# Patient Record
Sex: MALE | Race: WHITE | Employment: FULL TIME | URBAN - NONMETROPOLITAN AREA
[De-identification: names, ages, dates, MRNs, and addresses within clinical notes are randomized per-mention and may not be internally consistent; named-entity substitution may affect disease eponyms.]

---

## 2018-10-16 ENCOUNTER — APPOINTMENT (OUTPATIENT)
Dept: ANESTHESIOLOGY | Facility: HOSPITAL | Age: 63
End: 2018-10-16
Attending: OPHTHALMOLOGY
Payer: MEDICARE

## 2018-10-16 PROCEDURE — 00142 ANES PX ON EYE LENS SURGERY: CPT | Mod: QZ | Performed by: NURSE ANESTHETIST, CERTIFIED REGISTERED

## 2018-10-30 ENCOUNTER — APPOINTMENT (OUTPATIENT)
Dept: ANESTHESIOLOGY | Facility: HOSPITAL | Age: 63
End: 2018-10-30
Attending: OPHTHALMOLOGY
Payer: MEDICARE

## 2018-10-30 PROCEDURE — 00142 ANES PX ON EYE LENS SURGERY: CPT | Mod: QZ | Performed by: NURSE ANESTHETIST, CERTIFIED REGISTERED

## 2020-08-05 ENCOUNTER — TRANSFERRED RECORDS (OUTPATIENT)
Dept: HEALTH INFORMATION MANAGEMENT | Facility: CLINIC | Age: 65
End: 2020-08-05

## 2020-08-05 ENCOUNTER — HOSPITAL ENCOUNTER (EMERGENCY)
Facility: HOSPITAL | Age: 65
Discharge: ANOTHER HOSPITAL | End: 2020-08-05
Attending: INTERNAL MEDICINE | Admitting: INTERNAL MEDICINE
Payer: MEDICARE

## 2020-08-05 ENCOUNTER — APPOINTMENT (OUTPATIENT)
Dept: GENERAL RADIOLOGY | Facility: HOSPITAL | Age: 65
End: 2020-08-05
Attending: INTERNAL MEDICINE
Payer: MEDICARE

## 2020-08-05 VITALS
HEIGHT: 68 IN | RESPIRATION RATE: 16 BRPM | DIASTOLIC BLOOD PRESSURE: 65 MMHG | OXYGEN SATURATION: 98 % | SYSTOLIC BLOOD PRESSURE: 124 MMHG | WEIGHT: 165 LBS | TEMPERATURE: 95.6 F | HEART RATE: 80 BPM | BODY MASS INDEX: 25.01 KG/M2

## 2020-08-05 DIAGNOSIS — I21.09 ST ELEVATION MYOCARDIAL INFARCTION (STEMI) INVOLVING OTHER CORONARY ARTERY OF ANTERIOR WALL (H): ICD-10-CM

## 2020-08-05 LAB
ALBUMIN SERPL-MCNC: 3.3 G/DL (ref 3.4–5)
ALP SERPL-CCNC: 100 U/L (ref 40–150)
ALT SERPL W P-5'-P-CCNC: 23 U/L (ref 0–70)
ANION GAP SERPL CALCULATED.3IONS-SCNC: 10 MMOL/L (ref 3–14)
AST SERPL W P-5'-P-CCNC: 16 U/L (ref 0–45)
BASOPHILS # BLD AUTO: 0.1 10E9/L (ref 0–0.2)
BASOPHILS NFR BLD AUTO: 0.6 %
BILIRUB SERPL-MCNC: 0.2 MG/DL (ref 0.2–1.3)
BUN SERPL-MCNC: 23 MG/DL (ref 7–30)
CALCIUM SERPL-MCNC: 8.1 MG/DL (ref 8.5–10.1)
CHLORIDE SERPL-SCNC: 107 MMOL/L (ref 94–109)
CO2 SERPL-SCNC: 22 MMOL/L (ref 20–32)
CREAT SERPL-MCNC: 1.07 MG/DL (ref 0.66–1.25)
DIFFERENTIAL METHOD BLD: ABNORMAL
EJECTION FRACTION: 41.9 %
EOSINOPHIL # BLD AUTO: 0.5 10E9/L (ref 0–0.7)
EOSINOPHIL NFR BLD AUTO: 3.9 %
ERYTHROCYTE [DISTWIDTH] IN BLOOD BY AUTOMATED COUNT: 12.7 % (ref 10–15)
GFR SERPL CREATININE-BSD FRML MDRD: 71 ML/MIN/{1.73_M2}
GLUCOSE BLDC GLUCOMTR-MCNC: 121 MG/DL (ref 70–99)
GLUCOSE SERPL-MCNC: 176 MG/DL (ref 70–99)
HCT VFR BLD AUTO: 35.1 % (ref 40–53)
HGB BLD-MCNC: 12.7 G/DL (ref 13.3–17.7)
IMM GRANULOCYTES # BLD: 0.1 10E9/L (ref 0–0.4)
IMM GRANULOCYTES NFR BLD: 0.4 %
LYMPHOCYTES # BLD AUTO: 4.6 10E9/L (ref 0.8–5.3)
LYMPHOCYTES NFR BLD AUTO: 35.9 %
MCH RBC QN AUTO: 31.5 PG (ref 26.5–33)
MCHC RBC AUTO-ENTMCNC: 36.2 G/DL (ref 31.5–36.5)
MCV RBC AUTO: 87 FL (ref 78–100)
MONOCYTES # BLD AUTO: 1.3 10E9/L (ref 0–1.3)
MONOCYTES NFR BLD AUTO: 10.4 %
NEUTROPHILS # BLD AUTO: 6.2 10E9/L (ref 1.6–8.3)
NEUTROPHILS NFR BLD AUTO: 48.8 %
NRBC # BLD AUTO: 0 10*3/UL
NRBC BLD AUTO-RTO: 0 /100
PLATELET # BLD AUTO: 300 10E9/L (ref 150–450)
POTASSIUM SERPL-SCNC: 2.8 MMOL/L (ref 3.4–5.3)
PROT SERPL-MCNC: 6.9 G/DL (ref 6.8–8.8)
RBC # BLD AUTO: 4.03 10E12/L (ref 4.4–5.9)
SODIUM SERPL-SCNC: 139 MMOL/L (ref 133–144)
TROPONIN I SERPL-MCNC: <0.015 UG/L (ref 0–0.04)
WBC # BLD AUTO: 12.8 10E9/L (ref 4–11)

## 2020-08-05 PROCEDURE — 25000132 ZZH RX MED GY IP 250 OP 250 PS 637: Mod: GY | Performed by: INTERNAL MEDICINE

## 2020-08-05 PROCEDURE — 93010 ELECTROCARDIOGRAM REPORT: CPT | Performed by: INTERNAL MEDICINE

## 2020-08-05 PROCEDURE — 36415 COLL VENOUS BLD VENIPUNCTURE: CPT | Performed by: INTERNAL MEDICINE

## 2020-08-05 PROCEDURE — 96374 THER/PROPH/DIAG INJ IV PUSH: CPT

## 2020-08-05 PROCEDURE — 96375 TX/PRO/DX INJ NEW DRUG ADDON: CPT

## 2020-08-05 PROCEDURE — 80053 COMPREHEN METABOLIC PANEL: CPT | Performed by: INTERNAL MEDICINE

## 2020-08-05 PROCEDURE — 93005 ELECTROCARDIOGRAM TRACING: CPT

## 2020-08-05 PROCEDURE — 84484 ASSAY OF TROPONIN QUANT: CPT | Performed by: INTERNAL MEDICINE

## 2020-08-05 PROCEDURE — 00000146 ZZHCL STATISTIC GLUCOSE BY METER IP

## 2020-08-05 PROCEDURE — 85025 COMPLETE CBC W/AUTO DIFF WBC: CPT | Performed by: INTERNAL MEDICINE

## 2020-08-05 PROCEDURE — 99285 EMERGENCY DEPT VISIT HI MDM: CPT | Mod: Z6 | Performed by: INTERNAL MEDICINE

## 2020-08-05 PROCEDURE — 25000128 H RX IP 250 OP 636: Performed by: INTERNAL MEDICINE

## 2020-08-05 PROCEDURE — 99291 CRITICAL CARE FIRST HOUR: CPT | Mod: 25

## 2020-08-05 PROCEDURE — 25000128 H RX IP 250 OP 636

## 2020-08-05 PROCEDURE — 71045 X-RAY EXAM CHEST 1 VIEW: CPT | Mod: TC

## 2020-08-05 RX ORDER — TRAMADOL HYDROCHLORIDE 50 MG/1
50 TABLET ORAL EVERY 6 HOURS PRN
COMMUNITY

## 2020-08-05 RX ORDER — ALBUTEROL SULFATE 1.25 MG/3ML
1.25 SOLUTION RESPIRATORY (INHALATION) EVERY 6 HOURS PRN
COMMUNITY

## 2020-08-05 RX ORDER — FLUOXETINE 10 MG/1
40 CAPSULE ORAL DAILY
COMMUNITY

## 2020-08-05 RX ORDER — METOPROLOL SUCCINATE 25 MG/1
25 TABLET, EXTENDED RELEASE ORAL DAILY
COMMUNITY

## 2020-08-05 RX ORDER — ASPIRIN 81 MG/1
162 TABLET, CHEWABLE ORAL ONCE
Status: COMPLETED | OUTPATIENT
Start: 2020-08-05 | End: 2020-08-05

## 2020-08-05 RX ORDER — MORPHINE SULFATE 2 MG/ML
2 INJECTION, SOLUTION INTRAMUSCULAR; INTRAVENOUS ONCE
Status: COMPLETED | OUTPATIENT
Start: 2020-08-05 | End: 2020-08-05

## 2020-08-05 RX ORDER — HEPARIN SODIUM 5000 [USP'U]/.5ML
60 INJECTION, SOLUTION INTRAVENOUS; SUBCUTANEOUS ONCE
Status: COMPLETED | OUTPATIENT
Start: 2020-08-05 | End: 2020-08-05

## 2020-08-05 RX ORDER — GABAPENTIN 300 MG/1
300 CAPSULE ORAL AT BEDTIME
COMMUNITY

## 2020-08-05 RX ORDER — NITROGLYCERIN 20 MG/100ML
INJECTION INTRAVENOUS
Status: COMPLETED
Start: 2020-08-05 | End: 2020-08-05

## 2020-08-05 RX ORDER — ASPIRIN 81 MG/1
81 TABLET ORAL DAILY
COMMUNITY

## 2020-08-05 RX ORDER — MORPHINE SULFATE 2 MG/ML
4 INJECTION, SOLUTION INTRAMUSCULAR; INTRAVENOUS ONCE
Status: COMPLETED | OUTPATIENT
Start: 2020-08-05 | End: 2020-08-05

## 2020-08-05 RX ADMIN — MORPHINE SULFATE 4 MG: 2 INJECTION, SOLUTION INTRAMUSCULAR; INTRAVENOUS at 05:36

## 2020-08-05 RX ADMIN — NITROGLYCERIN 50000 MCG: 20 INJECTION INTRAVENOUS at 05:45

## 2020-08-05 RX ADMIN — HEPARIN SODIUM 4500 UNITS: 5000 INJECTION, SOLUTION INTRAVENOUS; SUBCUTANEOUS at 05:31

## 2020-08-05 RX ADMIN — MORPHINE SULFATE 2 MG: 2 INJECTION, SOLUTION INTRAMUSCULAR; INTRAVENOUS at 05:28

## 2020-08-05 RX ADMIN — ASPIRIN 81 MG 162 MG: 81 TABLET ORAL at 05:29

## 2020-08-05 RX ADMIN — TICAGRELOR 180 MG: 90 TABLET ORAL at 05:29

## 2020-08-05 ASSESSMENT — ENCOUNTER SYMPTOMS
FEVER: 0
BLOOD IN STOOL: 0
NUMBNESS: 0
DIAPHORESIS: 1
COLOR CHANGE: 0
PALPITATIONS: 0
ABDOMINAL DISTENTION: 0
WHEEZING: 0
ABDOMINAL PAIN: 0
FREQUENCY: 0
CHILLS: 0
VOMITING: 0
DIZZINESS: 0
COUGH: 0
ANAL BLEEDING: 0
CONFUSION: 0
SHORTNESS OF BREATH: 0
DYSURIA: 0
CHEST TIGHTNESS: 0
BACK PAIN: 0
FLANK PAIN: 0
NAUSEA: 0
LIGHT-HEADEDNESS: 0
HEADACHES: 0
WEAKNESS: 0
SLEEP DISTURBANCE: 0
NECK PAIN: 0
VOICE CHANGE: 0
MYALGIAS: 0

## 2020-08-05 ASSESSMENT — MIFFLIN-ST. JEOR: SCORE: 1497.94

## 2020-08-05 NOTE — ED PROVIDER NOTES
History     Chief Complaint   Patient presents with     Chest Pain     Lt chest pain     The history is provided by the patient.   Chest Pain   Pain location:  L chest  Pain quality: aching and pressure    Pain radiates to:  L shoulder  Onset quality:  Sudden  Duration:  1 hour  Timing:  Constant  Progression:  Worsening  Chronicity:  New  Associated symptoms: diaphoresis    Associated symptoms: no abdominal pain, no back pain, no cough, no dizziness, no fever, no headache, no nausea, no numbness, no palpitations, no shortness of breath, no vomiting and no weakness          Allergies:  No Known Allergies    Problem List:    There are no active problems to display for this patient.       Past Medical History:    No past medical history on file.    Past Surgical History:    No past surgical history on file.    Family History:    No family history on file.    Social History:  Marital Status:   [2]  Social History     Tobacco Use     Smoking status: Not on file   Substance Use Topics     Alcohol use: Not on file     Drug use: Not on file        Medications:    albuterol (ACCUNEB) 1.25 MG/3ML neb solution  aspirin 81 MG EC tablet  FLUoxetine (PROZAC) 10 MG capsule  gabapentin (NEURONTIN) 300 MG capsule  metoprolol succinate ER (TOPROL-XL) 25 MG 24 hr tablet  traMADol (ULTRAM) 50 MG tablet          Review of Systems   Constitutional: Positive for diaphoresis. Negative for chills and fever.   HENT: Negative for voice change.    Eyes: Negative for visual disturbance.   Respiratory: Negative for cough, chest tightness, shortness of breath and wheezing.    Cardiovascular: Positive for chest pain. Negative for palpitations and leg swelling.   Gastrointestinal: Negative for abdominal distention, abdominal pain, anal bleeding, blood in stool, nausea and vomiting.   Genitourinary: Negative for decreased urine volume, dysuria, flank pain and frequency.   Musculoskeletal: Negative for back pain, gait problem, myalgias and  "neck pain.   Skin: Negative for color change, pallor and rash.   Neurological: Negative for dizziness, syncope, weakness, light-headedness, numbness and headaches.   Psychiatric/Behavioral: Negative for confusion, sleep disturbance and suicidal ideas.       Physical Exam   BP: 90/55  Pulse: 63  Temp: (!) 95.4  F (35.2  C)  Resp: 15  Height: 172.7 cm (5' 8\")  Weight: 74.8 kg (165 lb)  SpO2: 97 %      Physical Exam  Vitals signs and nursing note reviewed.   Constitutional:       Appearance: He is well-developed.   HENT:      Head: Normocephalic and atraumatic.   Eyes:      Conjunctiva/sclera: Conjunctivae normal.      Pupils: Pupils are equal, round, and reactive to light.   Neck:      Musculoskeletal: Normal range of motion and neck supple.      Thyroid: No thyromegaly.      Vascular: No JVD.      Trachea: No tracheal deviation.   Cardiovascular:      Rate and Rhythm: Normal rate and regular rhythm.      Heart sounds: Normal heart sounds. No murmur. No gallop.    Pulmonary:      Effort: Pulmonary effort is normal. No respiratory distress.      Breath sounds: Normal breath sounds. No stridor. No wheezing or rales.   Chest:      Chest wall: No tenderness.   Abdominal:      General: Bowel sounds are normal. There is no distension.      Palpations: Abdomen is soft. There is no mass.      Tenderness: There is no abdominal tenderness. There is no guarding or rebound.   Musculoskeletal: Normal range of motion.         General: No tenderness.   Lymphadenopathy:      Cervical: No cervical adenopathy.   Skin:     General: Skin is warm.      Coloration: Skin is not pale.      Findings: No erythema or rash.   Neurological:      Mental Status: He is alert and oriented to person, place, and time.   Psychiatric:         Behavior: Behavior normal.         ED Course        Procedures                 Results for orders placed or performed during the hospital encounter of 08/05/20 (from the past 24 hour(s))   CBC with platelets " differential   Result Value Ref Range    WBC 12.8 (H) 4.0 - 11.0 10e9/L    RBC Count 4.03 (L) 4.4 - 5.9 10e12/L    Hemoglobin 12.7 (L) 13.3 - 17.7 g/dL    Hematocrit 35.1 (L) 40.0 - 53.0 %    MCV 87 78 - 100 fl    MCH 31.5 26.5 - 33.0 pg    MCHC 36.2 31.5 - 36.5 g/dL    RDW 12.7 10.0 - 15.0 %    Platelet Count 300 150 - 450 10e9/L    Diff Method Automated Method     % Neutrophils 48.8 %    % Lymphocytes 35.9 %    % Monocytes 10.4 %    % Eosinophils 3.9 %    % Basophils 0.6 %    % Immature Granulocytes 0.4 %    Nucleated RBCs 0 0 /100    Absolute Neutrophil 6.2 1.6 - 8.3 10e9/L    Absolute Lymphocytes 4.6 0.8 - 5.3 10e9/L    Absolute Monocytes 1.3 0.0 - 1.3 10e9/L    Absolute Eosinophils 0.5 0.0 - 0.7 10e9/L    Absolute Basophils 0.1 0.0 - 0.2 10e9/L    Abs Immature Granulocytes 0.1 0 - 0.4 10e9/L    Absolute Nucleated RBC 0.0    Troponin I   Result Value Ref Range    Troponin I ES <0.015 0.000 - 0.045 ug/L       Medications   nitroGLYcerin 50 mg in D5W 250 mL (adult std) 200-5 MCG/ML-% infusion (has no administration in time range)   ticagrelor (BRILINTA) tablet 180 mg (180 mg Oral Given 8/5/20 0529)   aspirin (ASA) chewable tablet 162 mg (162 mg Oral Given 8/5/20 0529)   heparin ANTICOAGULANT injection 4,500 Units (4,500 Units Intravenous Given 8/5/20 0531)   morphine (PF) injection 2 mg (2 mg Intravenous Given 8/5/20 0528)   morphine (PF) injection 4 mg (4 mg Intravenous Given 8/5/20 0536)       Assessments & Plan (with Medical Decision Making)   Left sided Chest pain started at 4:10 am, all the sudden accompanied sweating  Hx of HTN  Received 2 Nitroglycerin in ambulance  EKG: ST elevation in anterolateral lead with reciprocal depression in II, III, AVF  STEMI  Code activated, brilinta 180mg , asa 162( pt already got 162mg at home),  Heparin bolus given in ER  I spoke to Dr rabago in [facility], accepted for transfer to cardiac cath in [facility]  CXR: no acute finding    I have reviewed the nursing notes.    I have  reviewed the findings, diagnosis, plan and need for follow up with the patient.      New Prescriptions    No medications on file       Final diagnoses:   ST elevation myocardial infarction (STEMI) involving other coronary artery of anterior wall (H)       8/5/2020   [facility]     Emmanuel Kerr MD  08/05/20 0586

## 2020-08-05 NOTE — ED NOTES
8/05/2020 at 0750: [facility] RN called to get more information regarding patient transfer and requesting EKG be faxed to [phone]. Update on patient condition also given: Patient went directly to cath lab and received 2 stents, one in the proximal LAD and one in the first diagonal branch, and is currently in the CCU with no complaints. Dr. Kerr informed of this update.

## 2020-08-18 ENCOUNTER — HOSPITAL ENCOUNTER (OUTPATIENT)
Dept: CARDIAC REHAB | Facility: HOSPITAL | Age: 65
Setting detail: THERAPIES SERIES
End: 2020-08-18
Attending: INTERNAL MEDICINE
Payer: MEDICARE

## 2020-08-18 VITALS — HEIGHT: 68 IN | BODY MASS INDEX: 24.98 KG/M2 | WEIGHT: 164.8 LBS

## 2020-08-18 DIAGNOSIS — I21.02 ST ELEVATION MYOCARDIAL INFARCTION INVOLVING LEFT ANTERIOR DESCENDING (LAD) CORONARY ARTERY (H): ICD-10-CM

## 2020-08-18 PROCEDURE — 40000116 ZZH STATISTIC OP CR VISIT

## 2020-08-18 PROCEDURE — 93798 PHYS/QHP OP CAR RHAB W/ECG: CPT

## 2020-08-18 ASSESSMENT — 6 MINUTE WALK TEST (6MWT)
MALE CALC: 1739.42
PREDICTED: 1750.02
FEMALE CALC: 1550.25
TOTAL DISTANCE WALKED (FT): 1594
GENDER SELECTION: MALE

## 2020-08-18 ASSESSMENT — MIFFLIN-ST. JEOR: SCORE: 1497.03

## 2020-08-18 NOTE — PROGRESS NOTES
08/18/20 1200   Session   Session Initial Evaluation and Exercise Prescription   Certified through this date 09/16/20   Cardiac Rehab Assessment   Cardiac Rehab Assessment    General Information   Treatment Diagnosis STEMI8/18: Cornel initiates his Phase II Cardiac Rehab today after having a STEMI and JAMEE X 2 on Aug. 5,  2020. He states he has been doing well since his procedure. He continues to have some shortness of breath with exertion but finds it is less than it had been previous to his MI. 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 He has an albuterol inhaler that he has not been using as it did not help his shortness of breath.   Patient states he has had high blood pressure since he was young. He is treated with a beta blocker and ace inhibitor. Patient has never smoked cigarettes. He does use chewing tobacco. He has reduced his usage of chewing tobacco since he has been released from the hospital. He is not quite ready to choose a quit date.   Patient is quite physically active and states he eats a good diet. Patient is on an antidepressant. It has been determined that he most likely has seasonal depressive disorder. He uses a light therapy in the winter and in other seasons when he is not outside much. Patient will start exercising on 8/20 and will attend Tuesday's and Thursday's at 1PM.  The patient's history and clinical status including hemodynamics and ECG were evaluated. The patient was assessed to be stable and appropriate to begin exercise.   The patient's functional capacity and exercise prescription were determined by the completion of the 6 minute walk test. See results above. The patient was oriented to the program.  Risk factor profile was completed. Goals and objectives were discussed. CV response was WNL. No symptoms, complaints or pain were reported. Good prognosis for reaching  goals below. Skilled therapy is necessary in order to monitor CV response to exercise, to provide education on risk factors and behavior change counseling needed to achieve patient's goals.  Plan to progress to 30-40 minutes of exercise prior to discharge from cardiac rehab.  Initial THR of 20-30 beats above RHR; Effort rating of 4-6. Initiate muscle conditioning as appropriate. Provide risk factor education and behavior change counseling.      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   Lead up symptoms Chest discomfort increased until the point he had his wife call 911.   Hospital Location [facility] Discharge Date 08/07/20   Signs and Symptoms Post Hospital Discharge Other (see comments)  (dyspnea with exertion)   Comments Patient feels like he is almost better than he had been previous to his procedure.    Outpatient Cardiac Rehab Start Date 08/18/20   Primary Physician Damian   Primary Physician Follow Up Scheduled   Surgeon Trang   Surgeon Follow Up Scheduled   Cardiologist Jassi   Cardiologist Follow Up Completed   Ejection Fraction 41.9%   Risk Stratification High   Summary of Cath Report   Summary of Cath Report Available   Date Performed 08/05/20   Cath Report Comments s/p coronary angiogram resulting in successful primary PCI of D1 with POBA and PCI of LAD with JAMEE X 1, followed by successful placement of a second JAMEE X 1 for suspected dissection vs plaque rupture.   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 No   Have you fallen and had an injury in the past year? No   Timed Up and Go score (seconds) NA   Is patient a fall risk? No   Fall screen comments Patient feels steady on his feet.    Abuse Screen (yes response referral indicated)   Feels Unsafe at Home or Work/School no   Feels Threatened by Someone no   Does Anyone Try to Keep You From Having Contact with Others or Doing Things Outside Your Home? no   Physical Signs of Abuse Present no   Pain   Patient Currently in Pain No   Physical Assessments   Incisions WNL   Edema None   Right Lung Sounds normal   Left Lung Sounds normal   Limitations No limitations   Comments Patient's physical assessments are WNL.   Individualized Treatment Plan   Monitored Sessions Scheduled 24   Monitored Sessions Attended 1   Oxygen   Supplemental Oxygen needed No   Nutrition Management - Weight Management   Assessment Initial Assessment   Age 65   Weight 74.8 kg (164 lb 12.8 oz)   Height 1.727 m (5' 8\")   BMI (Calculated) 25.06   Goal Weight 70.3 kg (155 lb)   Initial Rate Your Plate Score. Dietary tool to assess eating patterns. Scores range from 24 to 72. The higher the score the healthier the eating pattern. 55   Weight Management Comments Patient states he has been 160's for his weight for a long time.    Nutrition Management - Lipids   Lipids Labs Available   Date 08/05/20   Total Cholesterol 197   Triglycerides 91   HDL 38      Prescribed Lipid Medication Yes   Statin Intensity High Intensity   Lipid Comments Patient takes medications as prescribed.   Nutrition Management - Diabetes   Diabetes No  (Pre-diabetic)   Nutrition Management Summary   Dietary Recommendations Low Fat;Low Cholesterol;Low Sodium   Stages of Change for Diet Compliance Preparation   Interventions Planned Attend Nutrition Education Class(es)   Nutrition Summary Comments Patient feels he eats a healthy diet. He eats very littel meat.  "   Nutrition Target Outcome Total Chol < 150, HDL > 40 (M), HDL > 50 (W), LDL < 70, Trig < 150   Psychosocial Management   Psychosocial Assessment Initial   Is there history of clinical depression or increased risk of depression? History of clinical depression   Current Level of Stress per Patient Report Denies   Current Coping Skills Uses Stress Management/Relaxation Techniques   Initial Patient Health Questionnaire -9 Score (PHQ-9) for depression. 5-9 Minimal symptoms, 10-14 Minor depression, 15-19 Major depression, moderately severe, > 20 Major depression, severe  2   Initial [facility] Survey score.  Quality of Life:   If total score > 25 review individual areas where patient rated a 4 or 5.  Consider patients current medical condition and what role that plays on the score.   Adjust treatment protocol to improve areas of concern.  Consider the following:  PHQ9 score, DASI, and re-assessment within the next 30 days to assist with developing treatments.  15   Stages of Change Action   Interventions Planned Patient to attend stress management class(es)   Psychosocial Comments Patient takes prozac for depression. He states it is a seasonal disorder. He also uses a light for treatment of his depression when needed.    Psychosocial Target Outcome Maximize coping skills   Other Core Components - Hypertension   History of or Diagnosis of Hypertension Yes   Currently taking Anti-Hypertensives Yes;Beta blocker;Ace Inhibitor   Hypertension Comments Patient takes medications as prescribed.    Other Core Components - Tobacco   History of Tobacco Use Yes   Tobacco Comments Patient has used chewing tobacco for many years. He has never smoked cigarettes. Patient states he is cutting back on the amount of chew he uses.    Other Core Components Summary   Interventions Planned Attend education class on Blood Pressure;Instruct patient on the DASH diet   Other Core Components Comments Patient states he has had high blood pressure  since he was young.    Other Core Components Target Outcome BP < 140/90 or < 130/80 with DM or CKD   Activity/Exercise History   Activity/Exercise Assessment Initial   Activity/Exercise Status prior to event? Was Physically Active   Number of Days Currently participating in Moderate Physical Activity? 7   Number of Days Currently performing  Aerobic Exercise (including rehab)? 0   Number of Minutes per Session Currently of Aerobic Exercise (average)? 0   Current Stage of Change (Physical Activity) Action   Current Stage of Change (Aerobic Exercise) Preparation   Patient Goals Goal #1   Goal #1 Description Patient would like to increase his survey scores and his 6MWT results by attending CR twice per week and implementing home exercise.     Goal #1 Target Date 10/20/20   Activity/Exercise Comments Patient has remained physically active since his MI and procedure.    Activity/Exercise Target Outcome An Accumulation of 150  Minutes of Aerobic Activity per Week   Exercise Assessment   6 Minute Walk Predicted - Gender Selection Male   6 Minute Walk Predicted (Male) 1739.42   6 Minute Walk Predicted (Female) 1550.25   Initial 6 Minute Walk Distance (Feet) 1594 ft   Resting HR 63 bpm   Exercise HR 96 bpm   Post Exercise HR 68 bpm   Resting /66   Exercise /70   Post Exercise /70   Pre SpO2 100   While Exercising SpO2 98   Post SpO2 100   Effort Rating 4   Current MET Level 3.3   MET Level Goal 4-5   ECG Rhythm Normal sinus rhythm   Ectopy None   Current Symptoms Denies symptoms   Limitations/Restrictions None   Exercise Prescription   Mode Nustep;Recumbant bike;Arm Ergometer;Weights  (Patient states he does not like to use a treadmill. )   Duration/Time 30-45 min   Frequency 2 days/week   THR (85% of age predicted max HR) 130.05   OMNI Effort Rating (0-10 Scale) 4-6/10   Progression Total exercise time of 20-30 minutes;Progress peak intensity by 1/4 MET per week   Comments Patient does not like to use  treadmill. He has had an injury to his ankle and treadmills bother this ankle.    Recommended Home Exercise   Type of Exercise Walking   Frequency (days per week) 3-4   Duration (minutes per session) 15-30 min   Effort Rating Recommended 4-6/10   30 Day Exercise Plan Staff discussed a HEP of walking to start with. He does have other exercise equipment at home.    Current Home Exercise   Type of Exercise None   Follow-up/On-going Support   Provider follow-up needed on the following No follow-up needed   Learning Assessment   Learner Patient   Primary Language English   Preferred Learning Style Listening;Reading;Demonstration   Barriers to Learning No barriers noted   Patient Education   Education recommended Anatomy and Physiology of the Heart;Blood Pressure;Exercise Principles;Heart Failure;Medication Overview;Muscle Conditioning;Nutrition;Risk Factors;Stress Management   Education Comments Patient will be given edcuation on pertinent topics during exercise sessions.    Physician cosignature/electronic signature indicates approval of this ITP document. I have established, reviewed and made necessary changes to the individualized treatment plan and exercise prescription for this patient.

## 2020-08-20 ENCOUNTER — HOSPITAL ENCOUNTER (OUTPATIENT)
Dept: CARDIAC REHAB | Facility: HOSPITAL | Age: 65
Setting detail: THERAPIES SERIES
End: 2020-08-20
Attending: INTERNAL MEDICINE
Payer: MEDICARE

## 2020-08-20 PROCEDURE — 40000116 ZZH STATISTIC OP CR VISIT

## 2020-08-20 PROCEDURE — 93798 PHYS/QHP OP CAR RHAB W/ECG: CPT

## 2020-08-25 ENCOUNTER — HOSPITAL ENCOUNTER (OUTPATIENT)
Dept: CARDIAC REHAB | Facility: HOSPITAL | Age: 65
Setting detail: THERAPIES SERIES
End: 2020-08-25
Attending: INTERNAL MEDICINE
Payer: MEDICARE

## 2020-08-25 PROCEDURE — 40000116 ZZH STATISTIC OP CR VISIT

## 2020-08-27 ENCOUNTER — HOSPITAL ENCOUNTER (OUTPATIENT)
Dept: CARDIAC REHAB | Facility: HOSPITAL | Age: 65
Setting detail: THERAPIES SERIES
End: 2020-08-27
Attending: INTERNAL MEDICINE
Payer: MEDICARE

## 2020-08-27 PROCEDURE — 93798 PHYS/QHP OP CAR RHAB W/ECG: CPT

## 2020-08-27 PROCEDURE — 40000116 ZZH STATISTIC OP CR VISIT

## 2020-09-01 ENCOUNTER — HOSPITAL ENCOUNTER (OUTPATIENT)
Dept: CARDIAC REHAB | Facility: HOSPITAL | Age: 65
Setting detail: THERAPIES SERIES
End: 2020-09-01
Attending: INTERNAL MEDICINE
Payer: MEDICARE

## 2020-09-01 PROCEDURE — 40000116 ZZH STATISTIC OP CR VISIT

## 2020-09-01 PROCEDURE — 93798 PHYS/QHP OP CAR RHAB W/ECG: CPT

## 2020-09-03 ENCOUNTER — HOSPITAL ENCOUNTER (OUTPATIENT)
Dept: CARDIAC REHAB | Facility: HOSPITAL | Age: 65
Setting detail: THERAPIES SERIES
End: 2020-09-03
Attending: INTERNAL MEDICINE
Payer: MEDICARE

## 2020-09-03 PROCEDURE — 40000116 ZZH STATISTIC OP CR VISIT

## 2020-09-03 PROCEDURE — 93798 PHYS/QHP OP CAR RHAB W/ECG: CPT

## 2020-09-08 ENCOUNTER — HOSPITAL ENCOUNTER (OUTPATIENT)
Dept: CARDIAC REHAB | Facility: HOSPITAL | Age: 65
Setting detail: THERAPIES SERIES
End: 2020-09-08
Attending: INTERNAL MEDICINE
Payer: MEDICARE

## 2020-09-08 PROCEDURE — 93798 PHYS/QHP OP CAR RHAB W/ECG: CPT

## 2020-09-08 PROCEDURE — 40000116 ZZH STATISTIC OP CR VISIT

## 2020-09-10 ENCOUNTER — HOSPITAL ENCOUNTER (OUTPATIENT)
Dept: CARDIAC REHAB | Facility: HOSPITAL | Age: 65
Setting detail: THERAPIES SERIES
End: 2020-09-10
Attending: INTERNAL MEDICINE
Payer: MEDICARE

## 2020-09-10 PROCEDURE — 93798 PHYS/QHP OP CAR RHAB W/ECG: CPT

## 2020-09-10 PROCEDURE — 40000116 ZZH STATISTIC OP CR VISIT

## 2020-09-14 VITALS — HEIGHT: 68 IN | WEIGHT: 162 LBS | BODY MASS INDEX: 24.55 KG/M2

## 2020-09-14 ASSESSMENT — 6 MINUTE WALK TEST (6MWT)
GENDER SELECTION: MALE
FEMALE CALC: 1559.81
TOTAL DISTANCE WALKED (FT): 1594
MALE CALC: 1746.77
PREDICTED: 1757.42

## 2020-09-14 ASSESSMENT — MIFFLIN-ST. JEOR: SCORE: 1484.33

## 2020-09-14 NOTE — PROGRESS NOTES
"   09/14/20 1400   Session   Session 30 Day Individualized Treatment Plan   Certified through this date 10/13/20   Cardiac Rehab Assessment   Cardiac Rehab Assessment 9/14: Cornel has attended 8 exercise sessions in Phase II Cardiac Rehab. He was given education regarding Heart Failure. He is attending regularly twice per week. He is engaged in his program and participates fully. He continues to eat a heart healthy diet. He states his MI is \"the best thing that has happened to me\". He believes it has restored his wm in humanity by meeting so many kind strangers. He is progressing well.   Skilled monitored therapy is necessary to help him continue safely advance to higher levels of physical activity and to monitor CV response to exercise.    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   Lead up symptoms Chest discomfort increased until the point he had his wife call 911.   Hospital Location  Discharge Date 08/07/20   Signs and Symptoms Post Hospital Discharge Other (see comments)  (dyspnea with exertion)   Outpatient Cardiac Rehab Start Date 08/18/20   Primary Physician Damian   Primary Physician Follow Up Scheduled   Surgeon Trang   Surgeon Follow Up Scheduled   Cardiologist Jassi   Cardiologist Follow Up Completed   Ejection Fraction 41.9%   Risk Stratification High   Summary of Cath Report   Summary of Cath Report Available   Date Performed 08/05/20   Cath Report Comments s/p coronary angiogram resulting in successful primary PCI of D1 with POBA and PCI of LAD with JAMEE X 1, followed by successful placement of a second JAMEE X 1 for suspected dissection vs plaque rupture.   Living and Work Status    Living Arrangements and Social Status house   Support System " "Live with an adult   Return to Employment Retired   Occupation [employer] - as well as various jobs   Preventative Medications   CMS recommended medications Ace inhibitors;Antiplatelets;Beta Blocker;Lipid Lowering   Fall Risk Screen   Fall screen completed by Cardiac Rehab   Have you fallen 2 or more times in the past year? No   Have you fallen and had an injury in the past year? No   Timed Up and Go score (seconds) NA   Is patient a fall risk? No   Fall screen comments 9/14:Patient feels steady on his feet.    Abuse Screen (yes response referral indicated)   Feels Unsafe at Home or Work/School no   Feels Threatened by Someone no   Does Anyone Try to Keep You From Having Contact with Others or Doing Things Outside Your Home? no   Physical Signs of Abuse Present no   Pain   Patient Currently in Pain No   Physical Assessments   Incisions Not assessed   Edema None   Right Lung Sounds not assessed   Left Lung Sounds not assessed   Limitations No limitations   Comments 9/14: Patient does not have any physical limitations.    Individualized Treatment Plan   Monitored Sessions Scheduled 24   Monitored Sessions Attended 8   Oxygen   Supplemental Oxygen needed No   Nutrition Management - Weight Management   Assessment Re-assessment   Age 65   Weight 73.5 kg (162 lb)   Height 1.727 m (5' 8\")   BMI (Calculated) 24.63   Goal Weight 70.3 kg (155 lb)   Initial Rate Your Plate Score. Dietary tool to assess eating patterns. Scores range from 24 to 72. The higher the score the healthier the eating pattern. 55   Weight Management Comments 9/14: Patient continues to follow his prescribed diet.    Nutrition Management - Lipids   Lipids Labs Available   Date 08/05/20   Total Cholesterol 197   Triglycerides 91   HDL 38      Prescribed Lipid Medication Yes   Statin Intensity High Intensity   Lipid Comments 9/14: Patient takes medications as prescribed.   Nutrition Management - Diabetes   Diabetes No  (Pre-diabetic)   Nutrition " Management Summary   Dietary Recommendations Low Fat;Low Cholesterol;Low Sodium   Stages of Change for Diet Compliance Preparation   Interventions Planned Attend Nutrition Education Class(es)   Nutrition Summary Comments Patient feels he eats a healthy diet. He eats very littel meat.    Nutrition Target Outcome Total Chol < 150, HDL > 40 (M), HDL > 50 (W), LDL < 70, Trig < 150   Psychosocial Management   Psychosocial Assessment Re-assessment   Is there history of clinical depression or increased risk of depression? History of clinical depression   Current Level of Stress per Patient Report Denies   Current Coping Skills Uses Stress Management/Relaxation Techniques   Initial Patient Health Questionnaire -9 Score (PHQ-9) for depression. 5-9 Minimal symptoms, 10-14 Minor depression, 15-19 Major depression, moderately severe, > 20 Major depression, severe  2   Initial [facility] Survey score.  Quality of Life:   If total score > 25 review individual areas where patient rated a 4 or 5.  Consider patients current medical condition and what role that plays on the score.   Adjust treatment protocol to improve areas of concern.  Consider the following:  PHQ9 score, DASI, and re-assessment within the next 30 days to assist with developing treatments.  15   Stages of Change Action   Interventions Planned Patient denies need for intervention at this time.   Psychosocial Comments 9/14: Patient states his MI is the best thing that happened to him as he has met so many kind people it is helping him see there is good in the world.    Psychosocial Target Outcome Maximize coping skills   Other Core Components - Hypertension   History of or Diagnosis of Hypertension Yes   Currently taking Anti-Hypertensives Yes;Beta blocker;Ace Inhibitor   Hypertension Comments 9/14: Patient continues to take all medications as prescribed.    Other Core Components - Tobacco   History of Tobacco Use Yes   Tobacco Comments Patient has used chewing  tobacco for many years. He has never smoked cigarettes. Patient states he is cutting back on the amount of chew he uses.    Other Core Components Summary   Interventions Planned Attend education class on Blood Pressure;Instruct patient on the DASH diet   Other Core Components Comments 9/14: Patients blood pressure has been fairly controlled since starting CR.    Other Core Components Target Outcome BP < 140/90 or < 130/80 with DM or CKD   Activity/Exercise History   Activity/Exercise Assessment Re-assessment   Activity/Exercise Status prior to event? Was Physically Active   Number of Days Currently participating in Moderate Physical Activity? 7   Number of Days Currently performing  Aerobic Exercise (including rehab)? 2   Number of Minutes per Session Currently of Aerobic Exercise (average)? 44   Current Stage of Change (Physical Activity) Action   Current Stage of Change (Aerobic Exercise) Preparation   Patient Goals Goal #1   Goal #1 Description Patient would like to increase his survey scores and his 6MWT results by attending CR twice per week and implementing home exercise.     Goal #1 Target Date 10/20/20   Goal #1 Progress Towards Goal 9/14: Patient has been consistent in his Phase II attendance.    Activity/Exercise Comments 9/14: Patient has remained physically active since his MI and procedure.    Activity/Exercise Target Outcome An Accumulation of 150  Minutes of Aerobic Activity per Week   Exercise Assessment   6 Minute Walk Predicted - Gender Selection Male   6 Minute Walk Predicted (Male) 1746.77   6 Minute Walk Predicted (Female) 1559.81   Initial 6 Minute Walk Distance (Feet) 1594 ft   Resting HR 87 bpm   Exercise  bpm   Post Exercise HR 97 bpm   Resting /82   Exercise /70   Post Exercise /70   Effort Rating 5   Current MET Level 7.9   MET Level Goal 8-9   ECG Rhythm Normal sinus rhythm   Ectopy None   Current Symptoms Denies symptoms   Limitations/Restrictions None   Exercise  Prescription   Mode Nustep;Recumbant bike;Arm Ergometer;Weights  (Patient states he does not like to use a treadmill. )   Duration/Time 30-45 min   Frequency 2 days/week   THR (85% of age predicted max HR) 130.05   OMNI Effort Rating (0-10 Scale) 4-6/10   Progression Total exercise time of 20-30 minutes;Progress peak intensity by 1/4 MET per week   Comments 9/14: Patient has been using the treadmill each visit to Phase II increasing his time.    Recommended Home Exercise   Type of Exercise Walking   Frequency (days per week) 3-4   Duration (minutes per session) 15-30 min   Effort Rating Recommended 4-6/10   30 Day Exercise Plan 9/14: Patient has not yet started his HEP.    Current Home Exercise   Type of Exercise None   Follow-up/On-going Support   Provider follow-up needed on the following No follow-up needed   Learning Assessment   Learner Patient   Primary Language English   Preferred Learning Style Listening;Reading;Demonstration   Barriers to Learning No barriers noted   Patient Education   Education recommended Anatomy and Physiology of the Heart;Blood Pressure;Exercise Principles;Heart Failure;Medication Overview;Muscle Conditioning;Nutrition;Risk Factors;Stress Management   Education classes attended Heart Failure   Education Comments 9/14: Patient was given informal education regarding heart failure.    Physician cosignature/electronic signature indicates approval of this ITP document. I have established, reviewed and made necessary changes to the individualized treatment plan and exercise prescription for this patient.

## 2020-09-15 ENCOUNTER — HOSPITAL ENCOUNTER (OUTPATIENT)
Dept: CARDIAC REHAB | Facility: HOSPITAL | Age: 65
Setting detail: THERAPIES SERIES
End: 2020-09-15
Attending: INTERNAL MEDICINE
Payer: MEDICARE

## 2020-09-15 PROCEDURE — 93798 PHYS/QHP OP CAR RHAB W/ECG: CPT

## 2020-09-15 PROCEDURE — 40000116 ZZH STATISTIC OP CR VISIT

## 2020-09-17 ENCOUNTER — HOSPITAL ENCOUNTER (OUTPATIENT)
Dept: CARDIAC REHAB | Facility: HOSPITAL | Age: 65
Setting detail: THERAPIES SERIES
End: 2020-09-17
Attending: INTERNAL MEDICINE
Payer: MEDICARE

## 2020-09-17 PROCEDURE — 93798 PHYS/QHP OP CAR RHAB W/ECG: CPT

## 2020-09-17 PROCEDURE — 40000116 ZZH STATISTIC OP CR VISIT

## 2020-09-22 ENCOUNTER — HOSPITAL ENCOUNTER (OUTPATIENT)
Dept: CARDIAC REHAB | Facility: HOSPITAL | Age: 65
Setting detail: THERAPIES SERIES
End: 2020-09-22
Attending: INTERNAL MEDICINE
Payer: MEDICARE

## 2020-09-22 PROCEDURE — 93798 PHYS/QHP OP CAR RHAB W/ECG: CPT

## 2020-09-22 PROCEDURE — 40000116 ZZH STATISTIC OP CR VISIT

## 2020-09-24 ENCOUNTER — HOSPITAL ENCOUNTER (OUTPATIENT)
Dept: CARDIAC REHAB | Facility: HOSPITAL | Age: 65
Setting detail: THERAPIES SERIES
End: 2020-09-24
Attending: INTERNAL MEDICINE
Payer: MEDICARE

## 2020-09-24 PROCEDURE — 40000116 ZZH STATISTIC OP CR VISIT

## 2020-09-24 PROCEDURE — 93798 PHYS/QHP OP CAR RHAB W/ECG: CPT

## 2020-09-29 ENCOUNTER — HOSPITAL ENCOUNTER (OUTPATIENT)
Dept: CARDIAC REHAB | Facility: HOSPITAL | Age: 65
Setting detail: THERAPIES SERIES
End: 2020-09-29
Attending: INTERNAL MEDICINE
Payer: MEDICARE

## 2020-09-29 PROCEDURE — 93798 PHYS/QHP OP CAR RHAB W/ECG: CPT

## 2020-09-29 PROCEDURE — 40000116 ZZH STATISTIC OP CR VISIT

## 2020-10-01 ENCOUNTER — HOSPITAL ENCOUNTER (OUTPATIENT)
Dept: CARDIAC REHAB | Facility: HOSPITAL | Age: 65
Setting detail: THERAPIES SERIES
End: 2020-10-01
Attending: INTERNAL MEDICINE
Payer: MEDICARE

## 2020-10-01 PROCEDURE — 93798 PHYS/QHP OP CAR RHAB W/ECG: CPT

## 2020-10-01 PROCEDURE — 999N000109 HC STATISTIC OP CR VISIT

## 2020-10-06 ENCOUNTER — HOSPITAL ENCOUNTER (OUTPATIENT)
Dept: CARDIAC REHAB | Facility: HOSPITAL | Age: 65
Setting detail: THERAPIES SERIES
End: 2020-10-06
Attending: INTERNAL MEDICINE
Payer: MEDICARE

## 2020-10-06 PROCEDURE — 93798 PHYS/QHP OP CAR RHAB W/ECG: CPT

## 2020-10-06 PROCEDURE — 999N000109 HC STATISTIC OP CR VISIT

## 2020-10-08 ENCOUNTER — HOSPITAL ENCOUNTER (OUTPATIENT)
Dept: CARDIAC REHAB | Facility: HOSPITAL | Age: 65
Setting detail: THERAPIES SERIES
End: 2020-10-08
Attending: INTERNAL MEDICINE
Payer: MEDICARE

## 2020-10-08 PROCEDURE — 999N000109 HC STATISTIC OP CR VISIT

## 2020-10-08 PROCEDURE — 93798 PHYS/QHP OP CAR RHAB W/ECG: CPT

## 2020-10-12 VITALS — HEIGHT: 68 IN | BODY MASS INDEX: 23.95 KG/M2 | WEIGHT: 158 LBS

## 2020-10-12 ASSESSMENT — 6 MINUTE WALK TEST (6MWT)
PREDICTED: 1767.98
FEMALE CALC: 1573.47
MALE CALC: 1757.26
TOTAL DISTANCE WALKED (FT): 1594
GENDER SELECTION: MALE

## 2020-10-12 ASSESSMENT — MIFFLIN-ST. JEOR: SCORE: 1466.18

## 2020-10-12 NOTE — PROGRESS NOTES
10/12/20 1400   Session   Session 60 Day Individualized Treatment Plan   Certified through this date 11/10/20   Cardiac Rehab Assessment   Cardiac Rehab Assessment 10/12: Pt completes 16 sessions of Phase II rehab since their discharge from [place]. Overall pt is progressing well and achieves MET level of 6.9 without event. During rehab sessions, pt continues to challenge themselves to increase during sessions by adjusting workloads. Pt's goal is to complete 6MWT with an increase of distance walked in the time allotted. Staff is optimistic this will be accomplished by aerobic capacity displayed during rehab sessions.    Pt has taken the opportunity to read the power point material made available by cardiac rehab staff and often discusses subject with staff. Pt is very inquisitive and respects the information provided. Pt and staff preparing for discharge during the next week.    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   Lead up symptoms Chest discomfort increased until the point he had his wife call 911.   Hospital Location [facility] Discharge Date 08/07/20   Outpatient Cardiac Rehab Start Date 08/18/20   Primary Physician Damian   Primary Physician Follow Up Scheduled   Specialist Trang   Specialist Follow Up Scheduled   Cardiologist Jassi   Cardiologist Follow Up Completed   Ejection Fraction 41.9%   Risk Stratification High   Summary of Cath Report   Summary of Cath Report Available   Date Performed 08/05/20   Cath Report Comments s/p coronary angiogram resulting in successful primary PCI of D1 with POBA and PCI of LAD with JAMEE X 1, followed by successful placement of a second JAMEE X 1 for suspected dissection vs plaque rupture.   Living and Work Status   "  Living Arrangements and Social Status house   Support System Live with an adult   Return to Employment Retired   Occupation [employer] - as well as various jobs   Preventative Medications   CMS recommended medications Ace inhibitors;Antiplatelets;Beta Blocker;Lipid Lowering   Fall Risk Screen   Fall screen completed by Cardiac Rehab   Have you fallen 2 or more times in the past year? No   Have you fallen and had an injury in the past year? No   Timed Up and Go score (seconds) NA   Is patient a fall risk? No   Fall screen comments 10/12: Pt denies falling and will not need an assessment by PT at this time.    Abuse Screen (yes response referral indicated)   Feels Unsafe at Home or Work/School no   Feels Threatened by Someone no   Does Anyone Try to Keep You From Having Contact with Others or Doing Things Outside Your Home? no   Physical Signs of Abuse Present no   Pain   Patient Currently in Pain No   Physical Assessments   Incisions Not assessed   Edema Not assessed   Right Lung Sounds not assessed   Left Lung Sounds not assessed   Limitations No limitations   Comments 10/12: Patient does not have any physical limitations.    Individualized Treatment Plan   Monitored Sessions Scheduled 24   Monitored Sessions Attended 16   Oxygen   Supplemental Oxygen needed No   Nutrition Management - Weight Management   Assessment Re-assessment   Age 65   Weight 71.7 kg (158 lb)   Height 1.727 m (5' 8\")   BMI (Calculated) 24.02   Goal Weight 70.3 kg (155 lb)   Initial Rate Your Plate Score. Dietary tool to assess eating patterns. Scores range from 24 to 72. The higher the score the healthier the eating pattern. 55   Weight Management Comments 10/12: Patient continues to follow his prescribed diet.    Nutrition Management - Lipids   Lipids Labs Available   Date 08/05/20   Total Cholesterol 197   Triglycerides 91   HDL 38      Prescribed Lipid Medication Yes   Statin Intensity High Intensity   Lipid Comments 10/12: Patient takes " medications as prescribed.   Nutrition Management - Diabetes   Diabetes No  (Pre-diabetic)   Nutrition Management Summary   Dietary Recommendations Low Fat;Low Cholesterol;Low Sodium   Stages of Change for Diet Compliance Preparation   Interventions Planned Attend Nutrition Education Class(es)   Interventions In Progress or Completed Other (see comments)  (Education provided on Nutrition)   Nutrition Summary Comments 10/12: Pt states they are consuming a diet that is rich with vegetables and fruit and tries to limit the amout of red meat as much as possible.    Nutrition Target Outcome Total Chol < 150, HDL > 40 (M), HDL > 50 (W), LDL < 70, Trig < 150   Psychosocial Management   Psychosocial Assessment Re-assessment   Is there history of clinical depression or increased risk of depression? History of clinical depression   Current Level of Stress per Patient Report Denies   Current Coping Skills Uses Stress Management/Relaxation Techniques   Initial Patient Health Questionnaire -9 Score (PHQ-9) for depression. 5-9 Minimal symptoms, 10-14 Minor depression, 15-19 Major depression, moderately severe, > 20 Major depression, severe  2   Initial [facility] Survey score.  Quality of Life:   If total score > 25 review individual areas where patient rated a 4 or 5.  Consider patients current medical condition and what role that plays on the score.   Adjust treatment protocol to improve areas of concern.  Consider the following:  PHQ9 score, DASI, and re-assessment within the next 30 days to assist with developing treatments.  15   Stages of Change Action   Interventions Planned Patient denies need for intervention at this time.   Patient Goal No   Psychosocial Comments 10/12: Patient states his MI is the best thing that happened to him as he has met so many kind people it is helping him see there is good in the world.    Psychosocial Target Outcome Maximize coping skills   Other Core Components - Hypertension   History of  or Diagnosis of Hypertension Yes   Currently taking Anti-Hypertensives Yes;Beta blocker;Ace Inhibitor   Hypertension Comments 10/12: Patient continues to take all medications as prescribed.    Other Core Components - Tobacco   History of Tobacco Use Yes   Tobacco Comments Patient has used chewing tobacco for many years. He has never smoked cigarettes. Patient states he is cutting back on the amount of chew he uses.    Other Core Components Summary   Interventions Planned Attend education class on Blood Pressure;Instruct patient on the DASH diet   Interventions In Progress or Completed Instructed on DASH diet   Patient Goals No   Other Core Components Comments 10/12: Pt does not need additional assessment pertaining to the core components at this time. Staff will continue to monitor and assess.    Other Core Components Target Outcome BP < 140/90 or < 130/80 with DM or CKD   Activity/Exercise History   Activity/Exercise Assessment Re-assessment   Activity/Exercise Status prior to event? Was Physically Active   Number of Days Currently participating in Moderate Physical Activity? 7   Number of Days Currently performing  Aerobic Exercise (including rehab)? 2   Number of Minutes per Session Currently of Aerobic Exercise (average)? 44   Current Stage of Change (Physical Activity) Action   Current Stage of Change (Aerobic Exercise) Preparation   Patient Goals Goal #1   Goal #1 Description Patient would like to increase his survey scores and his 6MWT results by attending CR twice per week and implementing home exercise.     Goal #1 Target Date 10/20/20   Goal #1 Progress Towards Goal 10/12: Since starting Phase II rehab, pt has been consistant with challenging themselves aerobically. Pt currently increased speed and grade on TM consistantly while in attendance. Staff anticipated increasing their 6MWT without event.    Activity/Exercise Comments 10/12: Patient has remained physically active since his MI and procedure.     Activity/Exercise Target Outcome An Accumulation of 150  Minutes of Aerobic Activity per Week   Exercise Assessment   6 Minute Walk Predicted - Gender Selection Male   6 Minute Walk Predicted (Male) 1757.26   6 Minute Walk Predicted (Female) 1573.47   Initial 6 Minute Walk Distance (Feet) 1594 ft   Resting HR 84 bpm   Exercise  bpm   Post Exercise HR 94 bpm   Resting /64   Exercise /72   Post Exercise /76   Effort Rating 5   Current MET Level 6.9   MET Level Goal 8-9   ECG Rhythm Sinus rhythm   Ectopy None   Current Symptoms Denies symptoms   Limitations/Restrictions None   Exercise Prescription   Mode Treadmill;Nustep;Recumbant bike;Arm Ergometer;Upright bike   Duration/Time 30-45 min   Frequency 2 days/week   THR (85% of age predicted max HR) 130.05   OMNI Effort Rating (0-10 Scale) 4-6/10   Progression Total exercise time of 20-30 minutes;Progress peak intensity by 1/4 MET per week   Comments 10/12: Pt has continued to exercise on the treadmilll and utilizing different modalities to challenge their exercise tolerance. Staff will continue to monitor and assess.    Recommended Home Exercise   Type of Exercise Walking   Frequency (days per week) 3-4   Duration (minutes per session) 15-30 min   Effort Rating Recommended 4-6/10   30 Day Exercise Plan 10/12: Pt continues to remain physically active throughout their time during rehab.    Current Home Exercise   Type of Exercise None   Frequency (days per week) 0   Duration (minutes per session) 0   Follow-up/On-going Support   Provider follow-up needed on the following No follow-up needed   Learning Assessment   Learner Patient   Primary Language English   Preferred Learning Style Listening;Reading;Demonstration   Barriers to Learning No barriers noted   Patient Education   Education recommended Anatomy and Physiology of the Heart;Blood Pressure;Exercise Principles;Heart Failure;Medication Overview;Muscle Conditioning;Nutrition;Risk  Factors;Stress Management   Education classes attended Anatomy and Physiology of the Heart;Blood Pressure;Heart Failure;Exercise Principles;Medication Overview   Education Comments 10/12: Staff continues to provide educational material on topics of risk factors for cardiac patients.   Physician cosignature/electronic signature indicates approval of this ITP document. I have established, reviewed and made necessary changes to the individualized treatment plan and exercise prescription for this patient.

## 2020-10-13 ENCOUNTER — HOSPITAL ENCOUNTER (OUTPATIENT)
Dept: CARDIAC REHAB | Facility: HOSPITAL | Age: 65
Setting detail: THERAPIES SERIES
End: 2020-10-13
Attending: INTERNAL MEDICINE
Payer: MEDICARE

## 2020-10-13 PROCEDURE — 999N000109 HC STATISTIC OP CR VISIT

## 2020-10-13 PROCEDURE — 93798 PHYS/QHP OP CAR RHAB W/ECG: CPT

## 2020-10-15 ENCOUNTER — HOSPITAL ENCOUNTER (OUTPATIENT)
Dept: CARDIAC REHAB | Facility: HOSPITAL | Age: 65
Setting detail: THERAPIES SERIES
End: 2020-10-15
Attending: INTERNAL MEDICINE
Payer: MEDICARE

## 2020-10-15 PROCEDURE — 93798 PHYS/QHP OP CAR RHAB W/ECG: CPT

## 2020-10-15 PROCEDURE — 999N000109 HC STATISTIC OP CR VISIT

## 2020-10-20 ENCOUNTER — HOSPITAL ENCOUNTER (OUTPATIENT)
Dept: CARDIAC REHAB | Facility: HOSPITAL | Age: 65
Setting detail: THERAPIES SERIES
End: 2020-10-20
Attending: INTERNAL MEDICINE
Payer: MEDICARE

## 2020-10-20 PROCEDURE — 93798 PHYS/QHP OP CAR RHAB W/ECG: CPT

## 2020-10-20 PROCEDURE — 999N000109 HC STATISTIC OP CR VISIT

## 2020-10-22 ENCOUNTER — HOSPITAL ENCOUNTER (OUTPATIENT)
Dept: CARDIAC REHAB | Facility: HOSPITAL | Age: 65
Setting detail: THERAPIES SERIES
End: 2020-10-22
Attending: INTERNAL MEDICINE
Payer: MEDICARE

## 2020-10-22 PROCEDURE — 93798 PHYS/QHP OP CAR RHAB W/ECG: CPT

## 2020-10-22 PROCEDURE — 999N000109 HC STATISTIC OP CR VISIT

## 2020-10-27 ENCOUNTER — HOSPITAL ENCOUNTER (OUTPATIENT)
Dept: CARDIAC REHAB | Facility: HOSPITAL | Age: 65
Setting detail: THERAPIES SERIES
End: 2020-10-27
Attending: INTERNAL MEDICINE
Payer: MEDICARE

## 2020-10-27 PROCEDURE — 999N000109 HC STATISTIC OP CR VISIT

## 2020-10-27 PROCEDURE — 93798 PHYS/QHP OP CAR RHAB W/ECG: CPT

## 2020-10-29 ENCOUNTER — HOSPITAL ENCOUNTER (OUTPATIENT)
Dept: CARDIAC REHAB | Facility: HOSPITAL | Age: 65
Setting detail: THERAPIES SERIES
End: 2020-10-29
Attending: INTERNAL MEDICINE
Payer: MEDICARE

## 2020-10-29 PROCEDURE — 93798 PHYS/QHP OP CAR RHAB W/ECG: CPT

## 2020-10-29 PROCEDURE — 999N000109 HC STATISTIC OP CR VISIT

## 2020-11-03 ENCOUNTER — HOSPITAL ENCOUNTER (OUTPATIENT)
Dept: CARDIAC REHAB | Facility: HOSPITAL | Age: 65
Setting detail: THERAPIES SERIES
End: 2020-11-03
Attending: INTERNAL MEDICINE
Payer: MEDICARE

## 2020-11-03 PROCEDURE — 999N000109 HC STATISTIC OP CR VISIT

## 2020-11-03 PROCEDURE — 93798 PHYS/QHP OP CAR RHAB W/ECG: CPT

## 2020-11-05 ENCOUNTER — HOSPITAL ENCOUNTER (OUTPATIENT)
Dept: CARDIAC REHAB | Facility: HOSPITAL | Age: 65
Setting detail: THERAPIES SERIES
End: 2020-11-05
Attending: INTERNAL MEDICINE
Payer: MEDICARE

## 2020-11-05 VITALS — BODY MASS INDEX: 23.95 KG/M2 | HEIGHT: 68 IN | WEIGHT: 158 LBS

## 2020-11-05 PROCEDURE — 999N000109 HC STATISTIC OP CR VISIT

## 2020-11-05 PROCEDURE — 93798 PHYS/QHP OP CAR RHAB W/ECG: CPT

## 2020-11-05 ASSESSMENT — 6 MINUTE WALK TEST (6MWT)
MALE CALC: 1757.26
TOTAL DISTANCE WALKED (FT): 1594
GENDER SELECTION: MALE
FEMALE CALC: 1573.47
PREDICTED: 1767.98

## 2020-11-05 ASSESSMENT — MIFFLIN-ST. JEOR: SCORE: 1466.18

## 2020-11-05 NOTE — PROGRESS NOTES
11/05/20 1400   Session   Session Discharge Note   Certified through this date 12/04/20   Cardiac Rehab Assessment   Cardiac Rehab Assessment 11/5: Pt completes 24 sessions of Phase II rehab since August 18th, an achieved a MET level of 10.0 without event on the UBE. Since the start of Phase II rehab pt has performed with their metrics, including hemodynamic responses during their entirety of rehab. Often time pt would adjust their exercise sessions to increase their overall stamina by alternating machines and resistance levels. Pt was willing to be challenged and enjoyed changes to their routine.    Pt had the goal to increase their 6MWT to which pt was able to achieve but was hindered by 3 bystanders disrupting their exercise. Overall, pt did well and increased their walk test with minimal effort and reported an appropriate RPE of 1/10. Pt continued their exercise routine following the walk test and achieved a personal best of MET by an increase of 2.0. Pt was surprised to learn they improved on their exercise and attribute their success to the cardiac team. Staff was able to see the weekly progression made in rehab and is positive they will continue to do well outside of rehab.     Pt made significant gains in exercise tolerance. Initially patient tolerated 42 minutes at 8 METs, now tolerating 46 minutes at 10 METs. Patient also increased 6-minute walk test by an increase of 51 feet. The PT was given instructions on frequency (3-4 x weekly), intensity (OMNI Effort Scale  4-6/10), and duration (30-45 minutes) for continued exercise as well as muscle conditioning and stretching exercises.  Your PT plans to maintain physical activity and is trying to determine their next exercise regimen following discharge from Phase II rehab.     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   Lead up symptoms Chest discomfort increased until the point he had his wife call 911.   Hospital Location [facility] ([place]) Discharge Date 08/07/20   Outpatient Cardiac Rehab Start Date 08/18/20   Primary Physician Damian   Primary Physician Follow Up Scheduled   Specialist Trang   Specialist Follow Up Scheduled   Cardiologist Jassi   Cardiologist Follow Up Completed   Ejection Fraction 41.9%   Risk Stratification High   Summary of Cath Report   Summary of Cath Report Available   Date Performed 08/05/20   Cath Report Comments s/p coronary angiogram resulting in successful primary PCI of D1 with POBA and PCI of LAD with JAMEE X 1, followed by successful placement of a second JAMEE X 1 for suspected dissection vs plaque rupture.   Living and Work Status    Living Arrangements and Social Status house;spouse   Support System Live with an adult   Return to Employment Retired   Occupation [employer] - as well as various jobs   Preventative Medications   CMS recommended medications Ace inhibitors;Antiplatelets;Beta Blocker;Lipid Lowering   Fall Risk Screen   Fall screen completed by Cardiac Rehab   Have you fallen 2 or more times in the past year? No   Have you fallen and had an injury in the past year? No   Timed Up and Go score (seconds) NA   Is patient a fall risk? No   Fall screen comments 11/5: Pt denies falling and will not need an assessment by PT at this time.    Abuse Screen (yes response referral indicated)   Feels Unsafe at Home or Work/School no   Feels Threatened by Someone no   Does Anyone Try to Keep You From Having Contact with Others or Doing Things Outside Your Home? no   Physical Signs of Abuse Present no   Pain   Patient Currently in Pain No   Physical Assessments   Incisions WNL   Edema None   Right Lung Sounds not assessed   Left Lung Sounds not assessed   Limitations No limitations   Comments 11/5:  "Patient does not have any physical limitations.    Individualized Treatment Plan   Monitored Sessions Scheduled 24   Monitored Sessions Attended 24   Oxygen   Supplemental Oxygen needed No   Nutrition Management - Weight Management   Assessment Discharge   Age 65   Weight 71.7 kg (158 lb)   Height 1.727 m (5' 8\")   BMI (Calculated) 24.02   Goal Weight 70.3 kg (155 lb)   Initial Rate Your Plate Score. Dietary tool to assess eating patterns. Scores range from 24 to 72. The higher the score the healthier the eating pattern. 55   Discharge Rate Your Plate Score 52   Weight Management Comments 11/5: Patient continues to follow his prescribed diet.    Nutrition Management - Lipids   Lipids Labs Available   Date 08/05/20   Total Cholesterol 197   Triglycerides 91   HDL 38      Prescribed Lipid Medication Yes   Statin Intensity High Intensity   Lipid Comments 11/5: Patient takes medications as prescribed.   Nutrition Management - Diabetes   Diabetes No  (Pre-diabetic)   Nutrition Management Summary   Dietary Recommendations Low Fat;Low Cholesterol;Low Sodium   Stages of Change for Diet Compliance Preparation   Interventions Planned Attend Nutrition Education Class(es)   Interventions In Progress or Completed Other (see comments)  (Education provided on Nutrition)   Nutrition Summary Comments 11/5: Pt states they are consuming a diet that is rich with vegetables and fruit and tries to limit the amout of red meat as much as possible.    Nutrition Target Outcome Total Chol < 150, HDL > 40 (M), HDL > 50 (W), LDL < 70, Trig < 150   Psychosocial Management   Psychosocial Assessment Discharge   Is there history of clinical depression or increased risk of depression? History of clinical depression   Current Level of Stress per Patient Report Denies   Current Coping Skills Uses Stress Management/Relaxation Techniques   Initial Patient Health Questionnaire -9 Score (PHQ-9) for depression. 5-9 Minimal symptoms, 10-14 Minor " depression, 15-19 Major depression, moderately severe, > 20 Major depression, severe  2   Discharge PHQ-9 Score for Depression 3   [facility].  Quality of Life:   If total score > 25 review individual areas where patient rated a 4 or 5.  Consider patients current medical condition and what role that plays on the score.   Adjust treatment protocol to improve areas of concern.  Consider the following:  PHQ9 score, DASI, and re-assessment within the next 30 days to assist with developing treatments.  15   Discharge [facility] COOP Survey Score 11   Stages of Change Action   Interventions Planned Patient denies need for intervention at this time.   Patient Goal No   Psychosocial Comments 11/5: Patient states his MI is the best thing that happened to him as he has met so many kind people it is helping him see there is good in the world.    Psychosocial Target Outcome Maximize coping skills   Other Core Components - Hypertension   History of or Diagnosis of Hypertension Yes   Currently taking Anti-Hypertensives Yes;Beta blocker;Ace Inhibitor   Hypertension Comments 11/5: Patient continues to take all medications as prescribed.    Other Core Components - Tobacco   History of Tobacco Use Yes   Tobacco Comments Patient has used chewing tobacco for many years. He has never smoked cigarettes. Patient states he is cutting back on the amount of chew he uses.    Other Core Components Summary   Interventions Planned Attend education class on Blood Pressure;Instruct patient on the DASH diet   Interventions In Progress or Completed Instructed on DASH diet;Educated on importance of maintaining low sodium diet   Patient Goals No   Other Core Components Comments 11/5: Pt does not need additional assessment pertaining to the core components at this time. Staff will continue to monitor and assess.    Other Core Components Target Outcome BP < 140/90 or < 130/80 with DM or CKD   Activity/Exercise History    Activity/Exercise Assessment Discharge   Activity/Exercise Status prior to event? Was Physically Active   Number of Days Currently participating in Moderate Physical Activity? 7   Number of Days Currently performing  Aerobic Exercise (including rehab)? 2   Number of Minutes per Session Currently of Aerobic Exercise (average)? 44   Current Stage of Change (Physical Activity) Action   Current Stage of Change (Aerobic Exercise) Preparation   Patient Goals Goal #1   Goal #1 Description Patient would like to increase his survey scores and his 6MWT results by attending CR twice per week and implementing home exercise.     Goal #1 Target Date 10/20/20   Goal #1 Date Met 11/05/20   Goal #1 Progress Towards Goal 11/5: Pt has increased their 6MWT and 1 assessment was imrpoved during the end of their rehab session.    Activity/Exercise Comments 11/5: Patient has remained physically active since his MI and procedure.    Activity/Exercise Target Outcome An Accumulation of 150  Minutes of Aerobic Activity per Week   Exercise Assessment   6 Minute Walk Predicted - Gender Selection Male   6 Minute Walk Predicted (Male) 1757.26   6 Minute Walk Predicted (Female) 1573.47   Initial 6 Minute Walk Distance (Feet) 1594 ft   Resting HR 93 bpm   Exercise  bpm   Post Exercise HR 94 bpm   Resting /72   Exercise /76   Post Exercise /72   Pre SpO2 100   While Exercising SpO2 98   Post SpO2 99   Effort Rating 1-6   Current MET Level 10   MET Level Goal 8-9   ECG Rhythm Sinus rhythm   Ectopy None   Current Symptoms Denies symptoms   Limitations/Restrictions None   Exercise Prescription   Mode Treadmill;Nustep;Recumbant bike;Arm Ergometer;Upright bike   Duration/Time 30-45 min   Frequency 2 days/week   THR (85% of age predicted max HR) 130.05   OMNI Effort Rating (0-10 Scale) 4-6/10   Progression Total exercise time of 20-30 minutes;Progress peak intensity by 1/4 MET per week   Comments 11/5: Pt has continued to exercise  on the treadmilll and utilizing different modalities to challenge their exercise tolerance. Staff will continue to monitor and assess.    Recommended Home Exercise   Type of Exercise Walking   Frequency (days per week) 3-4   Duration (minutes per session) 15-30 min   Effort Rating Recommended 4-6/10   30 Day Exercise Plan 11/5: Pt continues to remain physically active throughout their time during rehab.    Current Home Exercise   Type of Exercise None   Frequency (days per week) 0   Duration (minutes per session) 0   Follow-up/On-going Support   Provider follow-up needed on the following No follow-up needed   Learning Assessment   Learner Patient   Primary Language English   Preferred Learning Style Listening;Reading;Demonstration   Barriers to Learning No barriers noted   Patient Education   Education recommended Anatomy and Physiology of the Heart;Blood Pressure;Exercise Principles;Heart Failure;Medication Overview;Muscle Conditioning;Nutrition;Risk Factors;Stress Management   Education classes attended Anatomy and Physiology of the Heart;Blood Pressure;Heart Failure;Exercise Principles;Medication Overview;Risk Factors;Stress Management;Muscle Conditioning;Nutrition   Education Comments 11/5: Staff continues to provide educational material on topics of risk factors for cardiac patients.   Physician cosignature/electronic signature indicates agreements with the ITP document and approval of discharge.

## 2021-02-27 ENCOUNTER — HEALTH MAINTENANCE LETTER (OUTPATIENT)
Age: 66
End: 2021-02-27

## 2021-10-03 ENCOUNTER — HEALTH MAINTENANCE LETTER (OUTPATIENT)
Age: 66
End: 2021-10-03

## 2022-03-19 ENCOUNTER — HEALTH MAINTENANCE LETTER (OUTPATIENT)
Age: 67
End: 2022-03-19

## 2022-09-04 ENCOUNTER — HEALTH MAINTENANCE LETTER (OUTPATIENT)
Age: 67
End: 2022-09-04

## 2022-10-28 NOTE — ED NOTES
Pt states he was awake all night with restless legs when he got sudden onset of sharp left anterior chest pain radiating into left neck at 0410.  Pt states he took 2 baby Asprin and called EMS.  Pt was pale, diaphoretic and 8/10 CP upon arrival. Unable to transport via helicopter at that time as EMS was unable to obtain a clear 12 lead showing STEMI and pt was brought here to [facility].  Pt presents in similar fashion upon arrival to ED-a/o x 4, pale, diaphoretic, 8/10 CP, calm.   Pt states HX of HTN, denies other cardiac HX. States his brother  in his 40s of a heart attack.    Render In Strict Bullet Format?: No Continue Regimen: -\\n\\nClindamycin solution Detail Level: Zone

## 2023-04-29 ENCOUNTER — HEALTH MAINTENANCE LETTER (OUTPATIENT)
Age: 68
End: 2023-04-29